# Patient Record
Sex: MALE | Race: WHITE | NOT HISPANIC OR LATINO | URBAN - METROPOLITAN AREA
[De-identification: names, ages, dates, MRNs, and addresses within clinical notes are randomized per-mention and may not be internally consistent; named-entity substitution may affect disease eponyms.]

---

## 2022-05-25 ENCOUNTER — WEB ENCOUNTER (OUTPATIENT)
Dept: URBAN - METROPOLITAN AREA CLINIC 70 | Facility: CLINIC | Age: 37
End: 2022-05-25

## 2022-05-27 ENCOUNTER — DASHBOARD ENCOUNTERS (OUTPATIENT)
Age: 37
End: 2022-05-27

## 2022-05-27 ENCOUNTER — OFFICE VISIT (OUTPATIENT)
Dept: URBAN - METROPOLITAN AREA CLINIC 70 | Facility: CLINIC | Age: 37
End: 2022-05-27
Payer: COMMERCIAL

## 2022-05-27 VITALS
HEART RATE: 65 BPM | WEIGHT: 168.7 LBS | SYSTOLIC BLOOD PRESSURE: 120 MMHG | DIASTOLIC BLOOD PRESSURE: 74 MMHG | HEIGHT: 65 IN | BODY MASS INDEX: 28.11 KG/M2 | TEMPERATURE: 97.7 F

## 2022-05-27 DIAGNOSIS — R19.7 DIARRHEA OF PRESUMED INFECTIOUS ORIGIN: ICD-10-CM

## 2022-05-27 DIAGNOSIS — R10.30 LOWER ABDOMINAL PAIN: ICD-10-CM

## 2022-05-27 PROCEDURE — 99203 OFFICE O/P NEW LOW 30 MIN: CPT

## 2022-05-27 NOTE — PHYSICAL EXAM EYES:
Conjuntivae and eyelids appear normal,  Sclerae : White without injection
nontender/bowel sounds normal

## 2022-05-27 NOTE — HPI-TODAY'S VISIT:
Pt presents for diarrhea and lower abdominal pain.  CT 5/6/2022 showed fatty liver.  All other structures WNL.  Labs performed by PCP showed elevated WBC per the pt.  He states he was treated with abx and repeat labs showed WBC had returned to normal.  Records requested. He states that since taking the abx, his diarrhea has resolved.  He admits to continued occasional abdominal cramps, but states symptoms improve with the passage of bowel movements. He states that prior to diarrhea, bowel movements were normal and regular.  He denies sickness or sick contacts, new medications, or recent travel prior to onset of diarrhea. Colonoscopy in 2016 was normal.  He denies constipation, unintentional weight loss, rectal bleeding, fever, nausea, or vomiting.